# Patient Record
Sex: MALE | Race: AMERICAN INDIAN OR ALASKA NATIVE
[De-identification: names, ages, dates, MRNs, and addresses within clinical notes are randomized per-mention and may not be internally consistent; named-entity substitution may affect disease eponyms.]

---

## 2017-10-28 ENCOUNTER — HOSPITAL ENCOUNTER (EMERGENCY)
Dept: HOSPITAL 5 - ED | Age: 32
Discharge: HOME | End: 2017-10-28
Payer: COMMERCIAL

## 2017-10-28 VITALS — DIASTOLIC BLOOD PRESSURE: 79 MMHG | SYSTOLIC BLOOD PRESSURE: 135 MMHG
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
## 2017-10-28 DIAGNOSIS — S16.1XXA: Primary | ICD-10-CM
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
## 2017-10-28 DIAGNOSIS — S39.012A: ICD-10-CM

## 2017-10-28 PROCEDURE — 99283 EMERGENCY DEPT VISIT LOW MDM: CPT
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
## 2018-10-19 ENCOUNTER — HOSPITAL ENCOUNTER (EMERGENCY)
Dept: HOSPITAL 5 - ED | Age: 33
Discharge: HOME | End: 2018-10-19
Payer: COMMERCIAL

## 2018-10-19 VITALS — SYSTOLIC BLOOD PRESSURE: 110 MMHG | DIASTOLIC BLOOD PRESSURE: 68 MMHG

## 2018-10-19 DIAGNOSIS — R20.0: ICD-10-CM

## 2018-10-19 DIAGNOSIS — R07.89: Primary | ICD-10-CM

## 2018-10-19 LAB
BASOPHILS # (AUTO): 0 K/MM3 (ref 0–0.1)
BASOPHILS NFR BLD AUTO: 0.4 % (ref 0–1.8)
BUN SERPL-MCNC: 14 MG/DL (ref 9–20)
BUN/CREAT SERPL: 13 %
CALCIUM SERPL-MCNC: 9 MG/DL (ref 8.4–10.2)
EOSINOPHIL # BLD AUTO: 0.1 K/MM3 (ref 0–0.4)
EOSINOPHIL NFR BLD AUTO: 2.2 % (ref 0–4.3)
HCT VFR BLD CALC: 44.7 % (ref 35.5–45.6)
HEMOLYSIS INDEX: 5
HGB BLD-MCNC: 15 GM/DL (ref 11.8–15.2)
LYMPHOCYTES # BLD AUTO: 2.2 K/MM3 (ref 1.2–5.4)
LYMPHOCYTES NFR BLD AUTO: 42.9 % (ref 13.4–35)
MCH RBC QN AUTO: 30 PG (ref 28–32)
MCHC RBC AUTO-ENTMCNC: 33 % (ref 32–34)
MCV RBC AUTO: 90 FL (ref 84–94)
MONOCYTES # (AUTO): 0.5 K/MM3 (ref 0–0.8)
MONOCYTES % (AUTO): 9.5 % (ref 0–7.3)
PLATELET # BLD: 237 K/MM3 (ref 140–440)
RBC # BLD AUTO: 4.99 M/MM3 (ref 3.65–5.03)

## 2018-10-19 PROCEDURE — 85025 COMPLETE CBC W/AUTO DIFF WBC: CPT

## 2018-10-19 PROCEDURE — 71046 X-RAY EXAM CHEST 2 VIEWS: CPT

## 2018-10-19 PROCEDURE — 80048 BASIC METABOLIC PNL TOTAL CA: CPT

## 2018-10-19 PROCEDURE — 36415 COLL VENOUS BLD VENIPUNCTURE: CPT

## 2018-10-19 PROCEDURE — 84484 ASSAY OF TROPONIN QUANT: CPT

## 2018-10-19 PROCEDURE — 93005 ELECTROCARDIOGRAM TRACING: CPT

## 2018-10-19 PROCEDURE — 93010 ELECTROCARDIOGRAM REPORT: CPT

## 2018-10-19 NOTE — XRAY REPORT
CHEST TWO VIEWS: 10/19/18 06:34:00



CLINICAL: Chest pain.



COMPARISON: None



FINDINGS: Normal heart and pulmonary vasculature.  The lungs are 

normally expanded and clear.The bones and soft tissues are unremarkable.



IMPRESSION: Normal chest.

## 2018-10-19 NOTE — EMERGENCY DEPARTMENT REPORT
ED Chest Pain HPI





- General


Chief Complaint: Chest Pain


Stated Complaint: CHEST PAIN


Time Seen by Provider: 10/19/18 08:05


Source: patient, RN notes reviewed


Mode of arrival: Ambulatory


Limitations: No Limitations





- History of Present Illness


Initial Comments: 





This is a pleasant 32-year-old gentleman who is not known to this provider, left

-hand dominant, does not have a local primary care doctor, and denies chronic 

medical conditions.  The patient presents to the ER with a complaint of 

nontraumatic intermittent left-sided points chest wall tenderness, present for 

one week.  The pain does not radiate to the back, arms or neck, and there is no 

associated vomiting, diaphoresis, shortness of breath or nausea.





The patient denies leg pain, leg swelling, DVT/pulmonary embolus risk factors, 

denies recent aspirin ingestion, and also denies cocaine ingestion as well as 

family history of cardiac disease.





He is not currently having pain at this time, and declines pain medication.





He also endorses that earlier on this morning, he developed nontraumatic left 

shoulder numbness which radiated down to his left elbow, lasted for "a split 

second."  It has since resolved.  He currently denies headache, midline neck 

pain, extremity weakness, numbness, saddle anesthesia, bladder or bowel 

retention/incontinence.


MD Complaint: chest pain


-: Sudden


Onset: during rest


Pain Location: left chest


Pain Radiation: none


Severity: mild


Severity scale (0 -10): 3


Quality: aching


Consistency: intermittent


Improves With: nothing


Worsens With: nothing


Aspirin use within the Past 7 Days: (0) No





- Related Data


On Oral Contraceptives: No


 Previous Rx's











 Medication  Instructions  Recorded  Last Taken  Type


 


Acetaminophen/Codeine [Tylenol #3] 1 tab PO Q6H PRN #25 tab 06/22/15 Unknown Rx


 


Amoxicillin [Trimox CAP] 500 mg PO Q8H #40 capsule 06/22/15 Unknown Rx


 


Loratadine [Claritin] 10 mg PO DAILY #30 tablet 06/22/15 Unknown Rx


 


predniSONE [Deltasone] 40 mg PO QDAY #10 tab 06/22/15 Unknown Rx


 


Cyclobenzaprine [Flexeril] 10 mg PO TID PRN #30 tablet 10/28/17 Unknown Rx


 


Naproxen 500 mg PO BID PRN #14 tablet 10/28/17 Unknown Rx











 Allergies











Allergy/AdvReac Type Severity Reaction Status Date / Time


 


No Known Allergies Allergy   Unverified 06/22/15 11:01














Heart Score





- HEART Score


History: Slightly suspicious


EKG: Non-specific


Age: < 45


Risk factors: No known risk factors


Troponin: < normal limit


HEART Score: 1





- Critical Actions


Critical Actions: 0-3 pts:0.9-1.7%risk of adverse cardiac event.Candidate for 

discharge





ED Review of Systems


ROS: 


Stated complaint: CHEST PAIN


Other details as noted in HPI





Constitutional: denies: diaphoresis, fever, malaise


Eyes: denies: eye discharge


ENT: denies: epistaxis


Respiratory: denies: shortness of breath


Cardiovascular: chest pain


Gastrointestinal: denies: abdominal pain


Musculoskeletal: denies: arthralgia, myalgia


Neurological: numbness





ED Past Medical Hx





- Past Medical History


Previous Medical History?: No





- Surgical History


Past Surgical History?: No





- Social History


Smoking Status: Never Smoker


Substance Use Type: None





- Medications


Home Medications: 


 Home Medications











 Medication  Instructions  Recorded  Confirmed  Last Taken  Type


 


Acetaminophen/Codeine [Tylenol #3] 1 tab PO Q6H PRN #25 tab 06/22/15  Unknown Rx


 


Amoxicillin [Trimox CAP] 500 mg PO Q8H #40 capsule 06/22/15  Unknown Rx


 


Loratadine [Claritin] 10 mg PO DAILY #30 tablet 06/22/15  Unknown Rx


 


predniSONE [Deltasone] 40 mg PO QDAY #10 tab 06/22/15  Unknown Rx


 


Cyclobenzaprine [Flexeril] 10 mg PO TID PRN #30 tablet 10/28/17  Unknown Rx


 


Naproxen 500 mg PO BID PRN #14 tablet 10/28/17  Unknown Rx














ED Physical Exam





- General


Limitations: No Limitations


General appearance: alert, in no apparent distress





- Head


Head exam: Present: atraumatic, normocephalic





- Eye


Eye exam: Present: normal appearance, PERRL, EOMI, other (visual acuity intact 

to finger counting, color perception, reading at a close distance).  Absent: 

nystagmus





- ENT


ENT exam: Present: normal exam, normal orophraynx, mucous membranes moist, 

normal external ear exam





- Neck


Neck exam: Present: normal inspection, full ROM.  Absent: tenderness, 

meningismus





- Respiratory


Respiratory exam: Present: normal lung sounds bilaterally, chest wall 

tenderness.  Absent: respiratory distress





- Cardiovascular


Cardiovascular Exam: Present: regular rate, normal rhythm, normal heart sounds.

  Absent: systolic murmur, diastolic murmur, rubs, gallop





- GI/Abdominal


GI/Abdominal exam: Present: soft, normal bowel sounds.  Absent: distended, 

tenderness, guarding, rebound, rigid, pulsatile mass





- Rectal


Rectal exam: Present: deferred





- Extremities Exam


Extremities exam: Present: normal inspection, full ROM, normal capillary refill

, other (2+ pulses noted in the bilateral upper, lower extremities.  

Compartments soft.  No long bony tenderness.  The pelvis is stable.).  Absent: 

tenderness, pedal edema, joint swelling, calf tenderness





- Back Exam


Back exam: Present: normal inspection, full ROM.  Absent: tenderness, CVA 

tenderness (R), paraspinal tenderness, vertebral tenderness





- Neurological Exam


Neurological exam: Present: alert, oriented X3, CN II-XII intact, normal gait, 

other (Extraocular movements intact.  Tongue midline.  No facial droop.  Facial 

sensation intact to light touch in the V1, V2, V3 distribution bilaterally.  5 

and 5 strength in 4 extremities..  Sensation is intact to light touch in 4 

extremities.).  Absent: motor sensory deficit (sensation intact to light touch, 

pinprick in the bilateral upper extremity)





- Psychiatric


Psychiatric exam: Present: normal affect, normal mood





- Skin


Skin exam: Present: warm, dry, intact, normal color.  Absent: rash





ED Course


 Vital Signs











  10/19/18 10/19/18 10/19/18





  06:43 07:57 07:58


 


Temperature 98.5 F 97.9 F 


 


Pulse Rate 63 79 


 


Respiratory 16 19 19





Rate   


 


Blood Pressure 120/79  


 


Blood Pressure  117/68 





[Left]   


 


O2 Sat by Pulse 97 99 99





Oximetry   














EZEQUIEL score





- Ezequiel Score


Age > 65: (0) No


Aspirin use within the Past 7 Days: (0) No


3 or more CAD Risk Factors: (0) No


2 or more Angina events in past 24 hrs: (0) No


Known CAD with more than 50% Stenosis: (0) No


Elevated Cardiac Markers: (0) No


ST Deviation Greater than 0.5mm: (0) No


EZEQUIEL Score: 0





ED Medical Decision Making





- Lab Data


Result diagrams: 


 10/19/18 06:53





 10/19/18 06:53








 Vital Signs











  10/19/18 10/19/18 10/19/18





  06:43 07:57 07:58


 


Temperature 98.5 F 97.9 F 


 


Pulse Rate 63 79 


 


Respiratory 16 19 19





Rate   


 


Blood Pressure 120/79  


 


Blood Pressure  117/68 





[Left]   


 


O2 Sat by Pulse 97 99 99





Oximetry   











 Lab Results











  10/19/18 10/19/18 Range/Units





  06:53 06:53 


 


WBC  5.1   (4.5-11.0)  K/mm3


 


RBC  4.99   (3.65-5.03)  M/mm3


 


Hgb  15.0   (11.8-15.2)  gm/dl


 


Hct  44.7   (35.5-45.6)  %


 


MCV  90   (84-94)  fl


 


MCH  30   (28-32)  pg


 


MCHC  33   (32-34)  %


 


RDW  13.8   (13.2-15.2)  %


 


Plt Count  237   (140-440)  K/mm3


 


Lymph % (Auto)  42.9 H   (13.4-35.0)  %


 


Mono % (Auto)  9.5 H   (0.0-7.3)  %


 


Eos % (Auto)  2.2   (0.0-4.3)  %


 


Baso % (Auto)  0.4   (0.0-1.8)  %


 


Lymph #  2.2   (1.2-5.4)  K/mm3


 


Mono #  0.5   (0.0-0.8)  K/mm3


 


Eos #  0.1   (0.0-0.4)  K/mm3


 


Baso #  0.0   (0.0-0.1)  K/mm3


 


Seg Neutrophils %  45.0   (40.0-70.0)  %


 


Seg Neutrophils #  2.3   (1.8-7.7)  K/mm3


 


Sodium   140  (137-145)  mmol/L


 


Potassium   3.9  (3.6-5.0)  mmol/L


 


Chloride   101.2  ()  mmol/L


 


Carbon Dioxide   26  (22-30)  mmol/L


 


Anion Gap   17  mmol/L


 


BUN   14  (9-20)  mg/dL


 


Creatinine   1.1  (0.8-1.5)  mg/dL


 


Estimated GFR   > 60  ml/min


 


BUN/Creatinine Ratio   13  %


 


Glucose   95  ()  mg/dL


 


Calcium   9.0  (8.4-10.2)  mg/dL


 


Troponin T   < 0.010  (0.00-0.029)  ng/mL














- EKG Data


-: EKG Interpreted by Me


EKG shows normal: sinus rhythm





- EKG Data


When compared to previous EKG there are: previous EKG unavailable





10/19/18 08:57


EKG #1 demonstrated sinus bradycardia, 58 bpm, normal axis, normal intervals, 

poor only progression, abnormal EKG, this is not a STEMI





EKG #2 appears to be unchanged.





Q waves noted in lead 3.





tHis EKG is not a STEMI











- Radiology Data


Radiology results: image reviewed


interpreted by me: 





X-ray of the chest, interpreted by me, no acute disease











- Medical Decision Making





Differential diagnosis, including but not limited to: Costochondritis, 

pericarditis, myocarditis, pneumonia, GERD, gastritis, peripheral neuropathy





Assessment and plan: 32-year-old gentleman with no vascular risk factors, no 

pulmonary embolus or DVT risk factors, no risk by well's criteria, low risk by 

EZEQUIEL score, low risk by heart score , perc negative, an NIH score of 0, GCS of 

15, with resolving left-sided chest pain, and nonspecific left upper extremity 

numbness.





Patient is afebrile with reassuring vital signs and has a reassuring neurologic 

examination, and physical examination.  EKG with nonspecific abnormalities with 

no prior for comparison, he is at low risk for major adverse cardiac events and 

may follow up with outpatient cardiology for further evaluation of his chest 

pain and incidental abnormal findings on EKG.





Patient may follow up with an outpatient primary care doctor or neurologist for 

his resolved left upper extremity paresthesias; he has an NIH score of 0 at 

this time.  He is noted to plan a cellular phone at this time.  He declines 

pain medication.


Critical care attestation.: 


If time is entered above; I have spent that time in minutes in the direct care 

of this critically ill patient, excluding procedure time.








ED Disposition


Clinical Impression: 


 History of chest pain, History of paresthesia





Disposition: DC-01 TO HOME OR SELFCARE


Is pt being admited?: No


Does the pt Need Aspirin: No


Condition: Stable


Instructions:  Paresthesia (ED), Chest Pain (ED)


Additional Instructions: 


Follow up with a cardiologist or primary care doctor for chest pain within the 

next 3-5 days.  Local cardiology group's include [facility] heart cardiology

, [place]  [facility].





Follow-up with a primary care doctor or neurologist for history of left arm 

numbness within the next week.  Dr. Santacruz is a local neurology specialist.





Return to the ER right away with new pain, worsened pain, migration of pain, 

rectal vomiting, change in mental status, confusion, inability to tolerate 

liquid feeds.





Take over-the-counter acetaminophen, 650 mg, every 4-6 hours as needed for pain

, this may be alternated with ibuprofen, 400-600 mg, over-the-counter, with food

, every 6 hours as needed for pain.





[facility] as a local low-cost [facility] Center available for 

patient's convenience.


Referrals: 


[facility] [Provider Group] - 3-5 Days


[facility] [Provider Group] - 3-5 Days


[facility] [Provider Group] - 3-5 Days


BRODY SANTACRUZ MD [Staff Physician] - 3-5 Days